# Patient Record
Sex: FEMALE | Race: WHITE | NOT HISPANIC OR LATINO
[De-identification: names, ages, dates, MRNs, and addresses within clinical notes are randomized per-mention and may not be internally consistent; named-entity substitution may affect disease eponyms.]

---

## 2017-06-23 ENCOUNTER — APPOINTMENT (OUTPATIENT)
Age: 57
End: 2017-06-23

## 2017-06-23 VITALS
SYSTOLIC BLOOD PRESSURE: 168 MMHG | HEART RATE: 60 BPM | WEIGHT: 164 LBS | HEIGHT: 60 IN | RESPIRATION RATE: 17 BRPM | TEMPERATURE: 98 F | BODY MASS INDEX: 32.2 KG/M2 | DIASTOLIC BLOOD PRESSURE: 94 MMHG

## 2017-06-23 LAB
ALBUMIN SERPL ELPH-MCNC: 4.7 G/DL
ALP BLD-CCNC: 64 U/L
ALT SERPL-CCNC: 11 U/L
ANION GAP SERPL CALC-SCNC: 15 MMOL/L
AST SERPL-CCNC: 22 U/L
BASOPHILS # BLD AUTO: 0.02 K/UL
BASOPHILS NFR BLD AUTO: 0.3 %
BILIRUB SERPL-MCNC: 0.6 MG/DL
BUN SERPL-MCNC: 19 MG/DL
CALCIUM SERPL-MCNC: 9.7 MG/DL
CHLORIDE SERPL-SCNC: 102 MMOL/L
CO2 SERPL-SCNC: 25 MMOL/L
CREAT SERPL-MCNC: 0.96 MG/DL
EOSINOPHIL # BLD AUTO: 0.16 K/UL
EOSINOPHIL NFR BLD AUTO: 2 %
GLUCOSE SERPL-MCNC: 89 MG/DL
HCT VFR BLD CALC: 40.4 %
HGB BLD-MCNC: 13.2 G/DL
IMM GRANULOCYTES NFR BLD AUTO: 0.1 %
LYMPHOCYTES # BLD AUTO: 3.18 K/UL
LYMPHOCYTES NFR BLD AUTO: 40.7 %
MAN DIFF?: NORMAL
MCHC RBC-ENTMCNC: 30.6 PG
MCHC RBC-ENTMCNC: 32.7 GM/DL
MCV RBC AUTO: 93.7 FL
MONOCYTES # BLD AUTO: 0.32 K/UL
MONOCYTES NFR BLD AUTO: 4.1 %
NEUTROPHILS # BLD AUTO: 4.13 K/UL
NEUTROPHILS NFR BLD AUTO: 52.8 %
PLATELET # BLD AUTO: 277 K/UL
POTASSIUM SERPL-SCNC: 4.2 MMOL/L
PROT SERPL-MCNC: 8.5 G/DL
RBC # BLD: 4.31 M/UL
RBC # FLD: 13.7 %
SODIUM SERPL-SCNC: 142 MMOL/L
WBC # FLD AUTO: 7.82 K/UL

## 2017-06-24 LAB — AFP-TM SERPL-MCNC: 5 NG/ML

## 2017-06-25 LAB
HCV RNA SERPL NAA DL=5-ACNC: NOT DETECTED IU/ML
HCV RNA SERPL NAA+PROBE-LOG IU: NOT DETECTED LOGIU/ML

## 2017-07-07 ENCOUNTER — APPOINTMENT (OUTPATIENT)
Dept: ULTRASOUND IMAGING | Facility: CLINIC | Age: 57
End: 2017-07-07

## 2017-07-07 ENCOUNTER — OUTPATIENT (OUTPATIENT)
Dept: OUTPATIENT SERVICES | Facility: HOSPITAL | Age: 57
LOS: 1 days | End: 2017-07-07
Payer: COMMERCIAL

## 2017-07-07 DIAGNOSIS — Z00.8 ENCOUNTER FOR OTHER GENERAL EXAMINATION: ICD-10-CM

## 2017-07-07 DIAGNOSIS — B19.20 UNSPECIFIED VIRAL HEPATITIS C WITHOUT HEPATIC COMA: ICD-10-CM

## 2017-07-07 PROCEDURE — 76700 US EXAM ABDOM COMPLETE: CPT

## 2017-12-01 ENCOUNTER — APPOINTMENT (OUTPATIENT)
Age: 57
End: 2017-12-01

## 2017-12-11 ENCOUNTER — APPOINTMENT (OUTPATIENT)
Age: 57
End: 2017-12-11
Payer: COMMERCIAL

## 2017-12-11 VITALS
HEART RATE: 55 BPM | DIASTOLIC BLOOD PRESSURE: 93 MMHG | WEIGHT: 160 LBS | HEIGHT: 60 IN | SYSTOLIC BLOOD PRESSURE: 152 MMHG | TEMPERATURE: 98.2 F | BODY MASS INDEX: 31.41 KG/M2 | RESPIRATION RATE: 17 BRPM

## 2017-12-11 DIAGNOSIS — B19.20 UNSPECIFIED VIRAL HEPATITIS C W/OUT HEPATIC COMA: ICD-10-CM

## 2017-12-11 LAB
AFP-TM SERPL-MCNC: 4.9 NG/ML
ALBUMIN SERPL ELPH-MCNC: 4.5 G/DL
ALP BLD-CCNC: 58 U/L
ALT SERPL-CCNC: 19 U/L
AST SERPL-CCNC: 27 U/L
BASOPHILS # BLD AUTO: 0.02 K/UL
BASOPHILS NFR BLD AUTO: 0.3 %
BILIRUB DIRECT SERPL-MCNC: 0.1 MG/DL
BILIRUB INDIRECT SERPL-MCNC: 0.5 MG/DL
BILIRUB SERPL-MCNC: 0.6 MG/DL
EOSINOPHIL # BLD AUTO: 0.22 K/UL
EOSINOPHIL NFR BLD AUTO: 3.3 %
HCT VFR BLD CALC: 38.9 %
HGB BLD-MCNC: 12.5 G/DL
IMM GRANULOCYTES NFR BLD AUTO: 0.2 %
LYMPHOCYTES # BLD AUTO: 2.81 K/UL
LYMPHOCYTES NFR BLD AUTO: 42.7 %
MAN DIFF?: NORMAL
MCHC RBC-ENTMCNC: 30.2 PG
MCHC RBC-ENTMCNC: 32.1 GM/DL
MCV RBC AUTO: 94 FL
MONOCYTES # BLD AUTO: 0.4 K/UL
MONOCYTES NFR BLD AUTO: 6.1 %
NEUTROPHILS # BLD AUTO: 3.12 K/UL
NEUTROPHILS NFR BLD AUTO: 47.4 %
PLATELET # BLD AUTO: 281 K/UL
PROT SERPL-MCNC: 8 G/DL
RBC # BLD: 4.14 M/UL
RBC # FLD: 13.4 %
WBC # FLD AUTO: 6.58 K/UL

## 2017-12-11 PROCEDURE — 99213 OFFICE O/P EST LOW 20 MIN: CPT

## 2017-12-13 LAB
HCV RNA SERPL NAA DL=5-ACNC: NOT DETECTED
HCV RNA SERPL NAA+PROBE-LOG IU: NOT DETECTED LOGIU/ML

## 2018-12-13 ENCOUNTER — APPOINTMENT (OUTPATIENT)
Dept: HEPATOLOGY | Facility: CLINIC | Age: 58
End: 2018-12-13
Payer: COMMERCIAL

## 2018-12-13 VITALS
SYSTOLIC BLOOD PRESSURE: 136 MMHG | HEART RATE: 53 BPM | DIASTOLIC BLOOD PRESSURE: 76 MMHG | RESPIRATION RATE: 17 BRPM | TEMPERATURE: 98.2 F | WEIGHT: 173 LBS | HEIGHT: 60 IN | BODY MASS INDEX: 33.96 KG/M2

## 2018-12-13 DIAGNOSIS — B18.2 CHRONIC VIRAL HEPATITIS C: ICD-10-CM

## 2018-12-13 LAB
BASOPHILS # BLD AUTO: 0.02 K/UL
BASOPHILS NFR BLD AUTO: 0.3 %
EOSINOPHIL # BLD AUTO: 0.18 K/UL
EOSINOPHIL NFR BLD AUTO: 3.1 %
HCT VFR BLD CALC: 37.4 %
HGB BLD-MCNC: 12.2 G/DL
IMM GRANULOCYTES NFR BLD AUTO: 0.2 %
LYMPHOCYTES # BLD AUTO: 2.37 K/UL
LYMPHOCYTES NFR BLD AUTO: 40.6 %
MAN DIFF?: NORMAL
MCHC RBC-ENTMCNC: 29.8 PG
MCHC RBC-ENTMCNC: 32.6 GM/DL
MCV RBC AUTO: 91.2 FL
MONOCYTES # BLD AUTO: 0.45 K/UL
MONOCYTES NFR BLD AUTO: 7.7 %
NEUTROPHILS # BLD AUTO: 2.81 K/UL
NEUTROPHILS NFR BLD AUTO: 48.1 %
PLATELET # BLD AUTO: 275 K/UL
RBC # BLD: 4.1 M/UL
RBC # FLD: 13.1 %
WBC # FLD AUTO: 5.84 K/UL

## 2018-12-13 PROCEDURE — 99213 OFFICE O/P EST LOW 20 MIN: CPT | Mod: 25

## 2018-12-13 PROCEDURE — 91200 LIVER ELASTOGRAPHY: CPT

## 2018-12-13 PROCEDURE — ZZZZZ: CPT

## 2018-12-14 LAB
AFP-TM SERPL-MCNC: 4.2 NG/ML
ALBUMIN SERPL ELPH-MCNC: 4.9 G/DL
ALP BLD-CCNC: 53 U/L
ALT SERPL-CCNC: 18 U/L
ANION GAP SERPL CALC-SCNC: 12 MMOL/L
AST SERPL-CCNC: 21 U/L
BILIRUB SERPL-MCNC: 0.5 MG/DL
BUN SERPL-MCNC: 18 MG/DL
CALCIUM SERPL-MCNC: 9.7 MG/DL
CHLORIDE SERPL-SCNC: 102 MMOL/L
CO2 SERPL-SCNC: 28 MMOL/L
CREAT SERPL-MCNC: 0.76 MG/DL
GLUCOSE SERPL-MCNC: 89 MG/DL
HCV RNA SERPL NAA DL=5-ACNC: NOT DETECTED IU/ML
HCV RNA SERPL NAA+PROBE-LOG IU: NOT DETECTED LOGIU/ML
POTASSIUM SERPL-SCNC: 4.2 MMOL/L
PROT SERPL-MCNC: 7.5 G/DL
SODIUM SERPL-SCNC: 142 MMOL/L

## 2020-01-10 ENCOUNTER — APPOINTMENT (OUTPATIENT)
Dept: HEPATOLOGY | Facility: CLINIC | Age: 60
End: 2020-01-10

## 2024-04-15 ENCOUNTER — OUTPATIENT (OUTPATIENT)
Dept: OUTPATIENT SERVICES | Facility: HOSPITAL | Age: 64
LOS: 1 days | Discharge: STILL ADMITTED OR ADMINISTRATIVE | End: 2024-04-15
Payer: MEDICARE

## 2024-04-15 ENCOUNTER — EMERGENCY (EMERGENCY)
Facility: HOSPITAL | Age: 64
LOS: 1 days | Discharge: HOME | End: 2024-04-15
Admitting: EMERGENCY MEDICINE
Payer: MEDICARE

## 2024-04-15 VITALS
HEART RATE: 66 BPM | RESPIRATION RATE: 20 BRPM | TEMPERATURE: 98 F | DIASTOLIC BLOOD PRESSURE: 81 MMHG | SYSTOLIC BLOOD PRESSURE: 133 MMHG | OXYGEN SATURATION: 99 %

## 2024-04-15 DIAGNOSIS — F41.9 ANXIETY DISORDER, UNSPECIFIED: ICD-10-CM

## 2024-04-15 DIAGNOSIS — F32.9 MAJOR DEPRESSIVE DISORDER, SINGLE EPISODE, UNSPECIFIED: ICD-10-CM

## 2024-04-15 PROCEDURE — 90836 PSYTX W PT W E/M 45 MIN: CPT | Mod: 95

## 2024-04-15 PROCEDURE — 99284 EMERGENCY DEPT VISIT MOD MDM: CPT

## 2024-04-15 PROCEDURE — 99215 OFFICE O/P EST HI 40 MIN: CPT | Mod: 95

## 2024-04-15 PROCEDURE — 90792 PSYCH DIAG EVAL W/MED SRVCS: CPT

## 2024-04-15 RX ORDER — FAMOTIDINE 10 MG/ML
20 INJECTION INTRAVENOUS ONCE
Refills: 0 | Status: COMPLETED | OUTPATIENT
Start: 2024-04-15 | End: 2024-04-15

## 2024-04-15 RX ADMIN — FAMOTIDINE 20 MILLIGRAM(S): 10 INJECTION INTRAVENOUS at 15:32

## 2024-04-15 RX ADMIN — Medication 30 MILLILITER(S): at 15:32

## 2024-04-15 RX ADMIN — Medication 0.5 MILLIGRAM(S): at 15:32

## 2024-04-15 NOTE — ED BEHAVIORAL HEALTH ASSESSMENT NOTE - REFERRAL / APPOINTMENT DETAILS
encouraged to go back to [facility] PRN - as a bridge to eventually transition her psych care to a dedicated geropsych clinic;  other OP psych clinics within Pt's community

## 2024-04-15 NOTE — ED BEHAVIORAL HEALTH ASSESSMENT NOTE - HPI (INCLUDE ILLNESS QUALITY, SEVERITY, DURATION, TIMING, CONTEXT, MODIFYING FACTORS, ASSOCIATED SIGNS AND SYMPTOMS)
63 yr old female, , domiciled alone and a retiree. self reports hx of depression + anxiety; no psych admissions; past [facility] OPD attendance, 5/24/2000 - 1/24/2001; currently, not in any psych care - gets lexapro and xanax PRN prescribed by her PMD.  denied any hx of illicit substance use including alcohol.  Pt had recent [facility] ED visit anxiety.  pertinent medical issues: HTN, HLD, hx of gastritis; s/p EDG and s/p colonoscopy recently.  today, sent in from our [facility] due to depression (for a voluntary admission as no beds available).    denied experiencing any signs/ symptoms suggestive of ashli (denied grandiosity/ racing thoughts/ increased goal directed activities or engaged in risk taking behavior/ no pressured speech/ no elevated mood/ denied any increased in energy level causing sleep disruption).  is not feeling paranoid. denied any perceptual disturbances. no thought insertion/ withdrawal/ broadcasting 63 yr old female, , domiciled alone and a retiree. self reports hx of depression + anxiety; no psych admissions; past [facility] OPD attendance, 5/24/2000 - 1/24/2001; currently, not in any psych care - gets lexapro and xanax PRN prescribed by her PMD.  denied any hx of illicit substance use including alcohol.  Pt had recent [facility] ED visit anxiety.  pertinent medical issues: HTN, HLD, hx of gastritis; s/p EDG and s/p colonoscopy recently.  today, sent in from our [facility] due to depression (for a voluntary admission as no beds available).    Pt denied experiencing any signs/ symptoms suggestive of ashli (denied grandiosity/ racing thoughts/ increased goal directed activities or engaged in risk taking behavior/ no pressured speech/ no elevated mood/ denied any increased in energy level causing sleep disruption).  is not feeling paranoid. denied any perceptual disturbances. no thought insertion/ withdrawal/ broadcasting    COLLATERAL INFORMATION OBTAINED FROM FRIENDS:   who reported that Pt has hx of depression and anxiety. today, they decided to check up on the Pt as she was not answering her phone. when they came to her house, she was crying. told them that she felt overwhelmed and "was not feeling good".  she requested to be brought to the hospital.  depressive symptoms described as "not wanting to do anything"; has decreased oral intake; with low energy level. no SI and no HI reported. no psychotic symptoms. not aggressive or violent.  baseline: described as a good, kind and helpful individual.  friends decided to accompany her to the hospital. they expressed support that if the Pt gets discharged (from the  ED), they will come to her house, check on her.  friends reported that overall, the Pt is close with her 2 sons and their families (who are based in [place]); a son who is based in [place] is coming this month to visit the Pt 63 yr old female, , domiciled alone and a retiree. self reports hx of depression + anxiety; no psych admissions; past [facility] OPD attendance, 5/24/2000 - 1/24/2001; currently, not in any psych care - gets lexapro and xanax PRN prescribed by her PMD.  denied any hx of illicit substance use including alcohol.  Pt had recent [facility] ED visit anxiety.  pertinent medical issues: HTN, HLD, hx of gastritis; s/p EDG and s/p colonoscopy recently.  today, sent in from our [facility] due to depression (for a voluntary admission as no beds available).    Pt claims she has been experiencing "on and off bouts of depression and anxiety" for many yrs now.  overall though, she was able to cope/ deal with said symptom until about a month ago wherein she started to progressively feel depressed and anxious. recently, anxiety symptoms predominating depressive symptoms to a point that current xanax dose of 0.25mg daily PRN has self titrated to 0.5mg daily.  she also admits to self titrating her lexapro dose from 5mg daily to 20mg daily in an effort to stave off worsening depression. as it seems that said titration was not helping control symptoms, she decided to revert back to the current lexapro dose of 5mg daily.      described depressive symptoms as experiencing sad mood + anhedonia; associated symptoms include low energy level, poor concentration. no reported changes to her sleeping pattern. did admit that she has lost appetite - with resultant wt loss.  in addition, also claimed feeling hopeless. denied harboring any passive or active SI/ HI.   able to cite the following stressors (which had precipitated and perpetuated ongoing episode of depression + anxiety) : conflictual relationships; "stressed out" after she bought a condo in [place] - now, having "problems related to this condo"; and a former tenant of her's passed away x 1 month ago.      apart from her depression, also reports feeling anxious - experiencing panic attacks; waking up in the morning and having "gastric pains/ abdominal discomfort/ butterflies in her stomach sensation".  reported that she had recently undergone endoscopy. was told it was "normal" but prescribed with omeprazole PO x 14 days.  more so, admitted to excessively worrying about everything.  "I just want to feel good", she told writer.  Pt noted to be anxious.  writer attempted several times to persuade Pt to pursue 9.13 admission - and that she will temporarily board here at our  ED. she is adamant that she is unable to tolerate being boarded here overnight. she wants to go back home.     Pt denied experiencing any signs/ symptoms suggestive of ashli (denied grandiosity/ racing thoughts/ increased goal directed activities or engaged in risk taking behavior/ no pressured speech/ no elevated mood/ denied any increased in energy level causing sleep disruption).  is not feeling paranoid. denied any perceptual disturbances. no thought insertion/ withdrawal/ broadcasting    COLLATERAL INFORMATION OBTAINED FROM FRIENDS:   who reported that Pt has hx of depression and anxiety. today, they decided to check up on the Pt as she was not answering her phone. when they came to her house, she was crying. told them that she felt overwhelmed and "was not feeling good".  she requested to be brought to the hospital.  depressive symptoms described as "not wanting to do anything"; has decreased oral intake; with low energy level. no SI and no HI reported. no psychotic symptoms. not aggressive or violent.  baseline: described as a good, kind and helpful individual.  friends decided to accompany her to the hospital. they expressed support that if the Pt gets discharged (from the  ED), they will come to her house, check on her.  friends reported that overall, the Pt is close with her 2 sons and their families (who are based in [place]); a son who is based in [place] is coming this month to visit the Pt

## 2024-04-15 NOTE — ED BEHAVIORAL HEALTH ASSESSMENT NOTE - NSACTIVEVENT_PSY_ALL_CORE
conflictual relationships; "stressed out" after she bought a condo in [place] - now, having "problems related to this condo"; a former tenant of her's passed away x 1 month ago/Triggering events leading to humiliation, shame, and/or despair (e.g., Loss of relationship, financial or health status) (real or anticipated)

## 2024-04-15 NOTE — ED ADULT NURSE NOTE - OBJECTIVE STATEMENT
pt. received to  from walk in triage brought in by self from the [facility] center presenting for a psych eval. pt. endorses an increased in inability to perform ADLs, increased in depression and anxiety over the past x2 months. pt. maintains eye contact upon interview. pt. calm, cooperative and rediretable. Denies S/I and H/I,  A/H and V/H, ETOH/Drug use. Pt. belongings secured. pt. wanded for safety. pt. independently changed into  Clothing. eval on going at this time.

## 2024-04-15 NOTE — ED BEHAVIORAL HEALTH ASSESSMENT NOTE - OTHER
CVM, I stop conflictual relationships; "stressed out" after she bought a condo in [place] - now, having "problems related to this condo"; a former tenant of her's passed away x 1 month ago daughter in law, close friends-neighbors distracted Dr HERBERT White, [facility] attending

## 2024-04-15 NOTE — ED BEHAVIORAL HEALTH ASSESSMENT NOTE - SUMMARY
63/F with self reported hx of depression + anxiety; no psych admissions; past [facility] OPD attendance, 5/24/2000 - 1/24/2001; currently, not in any psych care - gets lexapro and xanax PRN prescribed by her PMD.  denied any hx of illicit substance use including alcohol.   today, sent in from our [facility] due to depression (for a voluntary admission as no beds available).    at this time, endorses progressively worsening symptoms of depression and anxiety.  depressive symptoms meeting MDD criteria whereas differentials to entertain for her current anxiety symptoms include: ZENON (admits to excessively worrying about everything) with panic attacks; panic disorder.      whilst she does admit feeling hopeless and overwhelmed, THERE HAS NEVER BEEN ANY PASSIVE OR ACTIVE SUICIDAL/ HOMICIDAL IDEATIONS, INTENT AND PLANS.  Pt initially expressed interest pursuing a 9.13 admission (at the [facility] today).  when she was adviced that currently, there are no female [facility] psych beds and that, she will have to board here at the  ED, she rescinded her 9.13 request.      at this time, the Pt does not present with symptoms at a point wherein  ED service is able to pursue involuntary admission - Pt DOES NOT MEET 9.39/9.27 criteria.  currently, not acutely manic; is not psychotic.  Pt is not delirious. is not intoxicated or in withdrawal.  able to partake towards discussion of her psych care - expressed interest towards availing of out-Pt psych services. willing to comeback to a nearby ED/ hosp if symptoms worsen.       RECOMMENDATIONS:   1. Psychoeducation provided.  Encouraged follow up with OP psych services.  No indication for emergent psych meds changes at this time. Discussed important role of psychotherapy.  for now, encouraged Pt to be compliant with current lexapro 5mg daily - which will need to be titrated again or may consider switching to another anti depressant.   2. Emergency protocol reviewed.  Pt, friends, daughter in law were adviced to call 911 or come to the nearest ED should symptoms worsen; have increasing bouts of agitation/aggressive behavior; having SI/HI; or call [phone]    3. given resources to the community: other OP psych clinics within Pt's community; encouraged [facility] PRN  4. ativan 0.5mg daily PRN x 3 day supply (for anxiety) 63/F with self reported hx of depression + anxiety; no psych admissions; past [facility] OPD attendance, 5/24/2000 - 1/24/2001; currently, not in any psych care - gets lexapro and xanax PRN prescribed by her PMD.  denied any hx of illicit substance use including alcohol.   today, sent in from our [facility] due to depression (for a voluntary admission as no beds available).    at this time, endorses progressively worsening symptoms of depression and anxiety.  depressive symptoms meeting MDD criteria whereas differentials to entertain for her current anxiety symptoms include: ZENON (admits to excessively worrying about everything) with panic attacks; panic disorder.      whilst she does admit feeling hopeless and overwhelmed, THERE HAS NEVER BEEN ANY PASSIVE OR ACTIVE SUICIDAL/ HOMICIDAL IDEATIONS, INTENT AND PLANS.  Pt initially expressed interest pursuing a 9.13 admission (at the [facility] today).  when she was adviced that currently, there are no female [facility] psych beds and that, she will have to board here at the  ED, she rescinded her 9.13 request.      at this time, the Pt does not present with symptoms at a point wherein  ED service is able to pursue involuntary admission - Pt DOES NOT MEET 9.39/9.27 criteria.  currently, not acutely manic; is not psychotic.  Pt is not delirious. is not intoxicated or in withdrawal.  able to partake towards discussion of her psych care - expressed interest towards availing of out-Pt psych services. willing to comeback to a nearby ED/ hosp if symptoms worsen.       RECOMMENDATIONS:   1. Psychoeducation provided.  Encouraged follow up with OP psych services.  No indication for emergent psych meds changes at this time. Discussed important role of psychotherapy.  for now, encouraged Pt to be compliant with current lexapro 5mg daily - which will need to be titrated again or may consider switching to another anti depressant.   2. Emergency protocol reviewed.  Pt, friends, daughter in law were adviced to call 911 or come to the nearest ED should symptoms worsen; have increasing bouts of agitation/aggressive behavior; having SI/HI; or call [phone]    3. given resources to the community: other OP psych clinics within Pt's community; encouraged [facility] PRN  4. ativan 0.5mg daily PRN x 3 day supply (for anxiety)    - ADDENDUM:  Pt has upcoming appt at [facility] this monday.. today, that appt was cancelled per daughter in law

## 2024-04-15 NOTE — ED BEHAVIORAL HEALTH ASSESSMENT NOTE - OTHER PAST PSYCHIATRIC HISTORY (INCLUDE DETAILS REGARDING ONSET, COURSE OF ILLNESS, INPATIENT/OUTPATIENT TREATMENT)
self endorses hx of depression + anxiety  claimed past hx of post partum depression   past [facility] OPD attendance, 5/24/2000 - 1/24/2001  no reported hx of in Pt psych admissions  denied any hx of SA nor engaged in any NSSIB  currently, NOT in psych care  today, self presented to the [facility]  due to symptoms of depression and anxiety as referred by her PMD; described symptoms as worsening depression + severe anxiety x > 3 months now.    - attending to ADLs but requiring more effort; spending most of the day on the couch  - claimed loosing 20 lbs over past 2 months due to poor appetite  - has been feeling very overwhelmed and hopeless

## 2024-04-15 NOTE — ED PROVIDER NOTE - PATIENT PORTAL LINK FT
You can access the FollowMyHealth Patient Portal offered by [facility] by registering at the following website: [email or web address]. By joining [facility]’s FollowMyHealth portal, you will also be able to view your health information using other applications (apps) compatible with our system.

## 2024-04-15 NOTE — ED BEHAVIORAL HEALTH ASSESSMENT NOTE - RISK ASSESSMENT
Risk Assessment:  Modifiable risk factors: depression, anxiety  Unmodifiable risk factors: elderly female, hx of depression/ anxiety, not in psych care for a long while now   Protective factors: currently NO ACTIVE OBJECTIVE findings of acute suicidality, no reported hx of SA nor any NSSIB, no family hx of SA, Pt's sense of responsibility to family, positive therapeutic relationship with family, social supports, no reported access to guns, no chronic pains, Restorationism, currently not acutely manic, not psychotic, not intoxicated or in withdrawal, no pending legal issues    Given above, the Pt is currently NOT in acute imminent risk of self harm as well as also NOT being at chronically elevated risk of self-harm.  currently, there is no identifiable acute increase in risk that   would be mitigated by an involuntary psychiatric admission. Pt refused to pursue voluntary admission to in-Pt unit (does not want to board overnight here at the  ED - claims she would feel more anxious if she stays here)

## 2024-04-15 NOTE — ED BEHAVIORAL HEALTH ASSESSMENT NOTE - MEDICATIONS (PRESCRIPTIONS, DIRECTIONS)
ativan 0.5mg daily PRN x 3 day supply (for anxiety). encouraged continued compliance with lexapro 5mg daily (will need titration)

## 2024-04-15 NOTE — ED ADULT NURSE NOTE - NSFALLUNIVINTERV_ED_ALL_ED
Bed/Stretcher in lowest position, wheels locked, appropriate side rails in place/Call bell, personal items and telephone in reach/Instruct patient to call for assistance before getting out of bed/chair/stretcher/Non-slip footwear applied when patient is off stretcher/[place] to call system/Physically safe environment - no spills, clutter or unnecessary equipment/Purposeful proactive rounding/Room/bathroom lighting operational, light cord in reach

## 2024-04-15 NOTE — ED BEHAVIORAL HEALTH ASSESSMENT NOTE - DIFFERENTIAL
MDD  anxiety: consider ZENON (admits to excessively worrying about everything) with panic attacks; panic disorder

## 2024-04-15 NOTE — ED BEHAVIORAL HEALTH ASSESSMENT NOTE - DETAILS
daughter in law, Carole Schrader updated re: final dispo.  case discussed with RUMA Stone none PATIENT IS NOT SUICIDAL

## 2024-04-15 NOTE — ED BEHAVIORAL HEALTH ASSESSMENT NOTE - NSSUICPROTFACT_PSY_ALL_CORE
Responsibility to children, family, or others/Identifies reasons for living/Supportive social network of family or friends/Fear of death or the actual act of killing self/Cultural, spiritual and/or moral attitudes against suicide/Positive therapeutic relationships/Pentecostalism beliefs

## 2024-04-15 NOTE — ED BEHAVIORAL HEALTH NOTE - BEHAVIORAL HEALTH NOTE
[facility]  Reference #: [id number]    Practitioner Count: 2  Pharmacy Count: 1  Current Opioid Prescriptions: 0  Current Benzodiazepine Prescriptions: 0  Current Stimulant Prescriptions: 0      Patient Demographic Information (PDI)       PDI	First Name	Last Name	Birth Date	Gender	Street Address	City	State	Zip Code  ALONDRA Schrader	1960	[place]	[address] [place]	[address]    Prescription Information      PDI Filter:    PDI	Current Rx	Drug Type	Rx Written	Rx Dispensed	Drug	Quantity	Days Supply	Prescriber Name	Prescriber CORINA #	Payment Method	Dispenser  A	N	B	03/25/2024	03/25/2024	alprazolam 0.5 mg tablet	30	15	Diana Rg	[id number]	Insurance	[facility]  A	N	B	02/15/2024	02/16/2024	alprazolam 0.25 mg tablet	30	30	Rayo Spann	[id number]	Insurance	[facility]      no yield on the psyckes    per CVM, OPD attendance, 5/24/2000 - 1/24/2001

## 2024-04-15 NOTE — ED BEHAVIORAL HEALTH ASSESSMENT NOTE - PAST PSYCHOTROPIC MEDICATION
self titrated lexapro from 5mg to 20mg daily previously - as a means to help control her depression/ anxiety symptoms; did not seek any psych consultation re: this titration. then titrated it back again to current dose of 5mg daily    reports her current xanax use from 0.25mg to 0.5mg daily PRN to current 0.5mg daily

## 2024-04-15 NOTE — ED BEHAVIORAL HEALTH ASSESSMENT NOTE - DESCRIPTION
HTN, HLD, gastritis; s/p EGD, s/p colonoscopy. meds: omeprazole 40mg AM, crestor 5mg HS, norvasc 5mg HS and losartan 50mg HS Since her  ED arrival, the Pt has been anxious and cooperative.  There has been no agitation/aggressive behavior.  No verbalization of active/ passive SI/HI.   There are no signs/symptoms of acute ashli or florid psychosis.  Pt is not showing any signs/symptoms of intoxication or withdrawal.  she is not delirious.. Pt has not tested limits.. Has maintained appropriate boundaries. Pt has been easily redirected.  Overall, there has been no management issues.    Vital Signs Last 24 Hrs  T(C): 36.6 (15 Apr 2024 13:41), Max: 36.6 (15 Apr 2024 13:41)  T(F): 97.9 (15 Apr 2024 13:41), Max: 97.9 (15 Apr 2024 13:41)  HR: 66 (15 Apr 2024 13:41) (66 - 66)  BP: 133/81 (15 Apr 2024 13:41) (133/81 - 133/81)  BP(mean): --  RR: 20 (15 Apr 2024 13:41) (20 - 20)  SpO2: 99% (15 Apr 2024 13:41) (99% - 99%)    Parameters below as of 15 Apr 2024 13:41  Patient On (Oxygen Delivery Method): room air  for 36 yrs until her   on 2013. 3 sons (2 sons living in [place]; another son based in [place]); has 4 grandchildren (3 girls, 1 boys).  retired school staff (since 5 yrs ago). originally from [place]. likes cooking, cleaning her house, being with friends, shopping, and gardening.  is Nondenominational. no reported access to guns

## 2024-04-15 NOTE — ED BEHAVIORAL HEALTH ASSESSMENT NOTE - NSPRESENTSXS_PSY_ALL_CORE
NO SUICIDAL/ NO HOMICIDAL IDEATIONS, INTENTIONS AND PLANS/Depressed mood/Anhedonia/Hopelessness or despair/Severe anxiety, agitation or panic

## 2024-04-15 NOTE — ED BEHAVIORAL HEALTH NOTE - BEHAVIORAL HEALTH NOTE
As per request of provider, writer contacted pt’s Daughter in law shikha diggs [phone] to obtain collateral information. The following information is per daughter  Pt is a 62 yo female domiciled w/ alone, [facility] Pt came in for worsening depression and anxiety. She says pt also had comlpaints of stomach pain. Pt has not endorsed any si or hi and has no hx of this. She says pt does appear hopeless. She says pt’s hygiene and appetite has worsened . she says the stressor is family/friend deaths. At baseline she is has anxiety and depression but it has worsened since February. She says medical problems include inflammation of stomach. Writer informed her pt was cleared for discharge and does not want to be admitted at this time. She says pt had an apt today w/ [facility] but it was cancelled. She does not know if she has a f/.u appointment.     Spoke w/ pt’s son in waiting room and explained pt would be cleared for discharge and offered f/u. writer explained pt was not at a point to be hospitalized involuntarily.    Writer met with pt at bedside to discuss urgent referral.  Pt reports being linked to [facility] and has an appointment rescheduled for Monday 04/22. [facility] crisis center information was provided to bridge pt if needed

## 2024-04-15 NOTE — ED ADULT NURSE NOTE - CHIEF COMPLAINT QUOTE
pt sent for ADM by Dr. Mere White pt stated for 2 months feeling depress, anxious, lost of appetite, + nausea, denies SI.  pt states in the morning is when she feels the worse and take Xanax.

## 2024-04-15 NOTE — ED BEHAVIORAL HEALTH ASSESSMENT NOTE - NSBHROSSYSTEMS_PSY_ALL_CORE
currently Pt denied any headaches, no dizziness, no blurring of vision; no sorethroat; no cough, no fever. no chest pains, no SOB, no palpitations, no abdominal pains, no nausea/ vomiting/ diarrhea, no dysuria, no hesitancy, no arthralgia/ no pruritus. denied any muscle/ joint pains

## 2024-04-16 DIAGNOSIS — F33.9 MAJOR DEPRESSIVE DISORDER, RECURRENT, UNSPECIFIED: ICD-10-CM

## 2024-04-16 NOTE — ED BEHAVIORAL HEALTH NOTE - BEHAVIORAL HEALTH NOTE
High Risk log:  Number listed in sunrise [phone]. writer called and left voicemail requesting for call back.

## 2024-04-17 NOTE — ED BEHAVIORAL HEALTH NOTE - BEHAVIORAL HEALTH NOTE
High Risk log:  Number listed in sunrise [phone]. writer called patient ([phone]) who states that she is doing ok. She states that she feels weak but is following up  with her pcp Dr. Rayo Spann at 11am today. She states that she has an apt for mental health on Monday. No other concerns noted.

## 2025-02-03 ENCOUNTER — TRANSCRIPTION ENCOUNTER (OUTPATIENT)
Age: 65
End: 2025-02-03

## 2025-03-01 ENCOUNTER — EMERGENCY (EMERGENCY)
Facility: HOSPITAL | Age: 65
LOS: 1 days | Discharge: HOME | End: 2025-03-01
Attending: STUDENT IN AN ORGANIZED HEALTH CARE EDUCATION/TRAINING PROGRAM | Admitting: STUDENT IN AN ORGANIZED HEALTH CARE EDUCATION/TRAINING PROGRAM
Payer: MEDICARE

## 2025-03-01 VITALS
HEIGHT: 60 IN | DIASTOLIC BLOOD PRESSURE: 83 MMHG | SYSTOLIC BLOOD PRESSURE: 124 MMHG | TEMPERATURE: 98 F | WEIGHT: 162.92 LBS | OXYGEN SATURATION: 98 % | RESPIRATION RATE: 20 BRPM | HEART RATE: 60 BPM

## 2025-03-01 DIAGNOSIS — F41.9 ANXIETY DISORDER, UNSPECIFIED: ICD-10-CM

## 2025-03-01 DIAGNOSIS — F33.2 MAJOR DEPRESSIVE DISORDER, RECURRENT SEVERE WITHOUT PSYCHOTIC FEATURES: ICD-10-CM

## 2025-03-01 PROBLEM — F32.9 MAJOR DEPRESSIVE DISORDER, SINGLE EPISODE, UNSPECIFIED: Chronic | Status: ACTIVE | Noted: 2024-04-15

## 2025-03-01 PROBLEM — K29.70 GASTRITIS, UNSPECIFIED, WITHOUT BLEEDING: Chronic | Status: ACTIVE | Noted: 2024-04-15

## 2025-03-01 LAB
ADD ON TEST-SPECIMEN IN LAB: SIGNIFICANT CHANGE UP
ALBUMIN SERPL ELPH-MCNC: 4.6 G/DL — SIGNIFICANT CHANGE UP (ref 3.3–5)
ALP SERPL-CCNC: 80 U/L — SIGNIFICANT CHANGE UP (ref 40–120)
ALT FLD-CCNC: 19 U/L — SIGNIFICANT CHANGE UP (ref 10–45)
ANION GAP SERPL CALC-SCNC: 13 MMOL/L — SIGNIFICANT CHANGE UP (ref 5–17)
APAP SERPL-MCNC: <15 UG/ML — SIGNIFICANT CHANGE UP (ref 10–30)
AST SERPL-CCNC: 18 U/L — SIGNIFICANT CHANGE UP (ref 10–40)
BASOPHILS # BLD AUTO: 0.05 K/UL — SIGNIFICANT CHANGE UP (ref 0–0.2)
BASOPHILS NFR BLD AUTO: 0.6 % — SIGNIFICANT CHANGE UP (ref 0–2)
BILIRUB SERPL-MCNC: 1 MG/DL — SIGNIFICANT CHANGE UP (ref 0.2–1.2)
BUN SERPL-MCNC: 15 MG/DL — SIGNIFICANT CHANGE UP (ref 7–23)
CALCIUM SERPL-MCNC: 10.1 MG/DL — SIGNIFICANT CHANGE UP (ref 8.4–10.5)
CHLORIDE SERPL-SCNC: 100 MMOL/L — SIGNIFICANT CHANGE UP (ref 96–108)
CO2 SERPL-SCNC: 26 MMOL/L — SIGNIFICANT CHANGE UP (ref 22–31)
CREAT SERPL-MCNC: 0.78 MG/DL — SIGNIFICANT CHANGE UP (ref 0.5–1.3)
EGFR: 84 ML/MIN/1.73M2 — SIGNIFICANT CHANGE UP
EOSINOPHIL # BLD AUTO: 0 K/UL — SIGNIFICANT CHANGE UP (ref 0–0.5)
EOSINOPHIL NFR BLD AUTO: 0 % — SIGNIFICANT CHANGE UP (ref 0–6)
ETHANOL SERPL-MCNC: <10 MG/DL — SIGNIFICANT CHANGE UP (ref 0–10)
FLUAV AG NPH QL: SIGNIFICANT CHANGE UP
FLUBV AG NPH QL: SIGNIFICANT CHANGE UP
GLUCOSE SERPL-MCNC: 112 MG/DL — HIGH (ref 70–99)
HCT VFR BLD CALC: 40.2 % — SIGNIFICANT CHANGE UP (ref 34.5–45)
HGB BLD-MCNC: 13 G/DL — SIGNIFICANT CHANGE UP (ref 11.5–15.5)
IMM GRANULOCYTES NFR BLD AUTO: 0.5 % — SIGNIFICANT CHANGE UP (ref 0–0.9)
LYMPHOCYTES # BLD AUTO: 2.62 K/UL — SIGNIFICANT CHANGE UP (ref 1–3.3)
LYMPHOCYTES # BLD AUTO: 29.7 % — SIGNIFICANT CHANGE UP (ref 13–44)
MCHC RBC-ENTMCNC: 29.4 PG — SIGNIFICANT CHANGE UP (ref 27–34)
MCHC RBC-ENTMCNC: 32.3 G/DL — SIGNIFICANT CHANGE UP (ref 32–36)
MCV RBC AUTO: 91 FL — SIGNIFICANT CHANGE UP (ref 80–100)
MONOCYTES # BLD AUTO: 0.85 K/UL — SIGNIFICANT CHANGE UP (ref 0–0.9)
MONOCYTES NFR BLD AUTO: 9.6 % — SIGNIFICANT CHANGE UP (ref 2–14)
NEUTROPHILS # BLD AUTO: 5.25 K/UL — SIGNIFICANT CHANGE UP (ref 1.8–7.4)
NEUTROPHILS NFR BLD AUTO: 59.6 % — SIGNIFICANT CHANGE UP (ref 43–77)
NRBC BLD AUTO-RTO: 0 /100 WBCS — SIGNIFICANT CHANGE UP (ref 0–0)
PLATELET # BLD AUTO: 388 K/UL — SIGNIFICANT CHANGE UP (ref 150–400)
POTASSIUM SERPL-MCNC: 4.7 MMOL/L — SIGNIFICANT CHANGE UP (ref 3.5–5.3)
POTASSIUM SERPL-SCNC: 4.7 MMOL/L — SIGNIFICANT CHANGE UP (ref 3.5–5.3)
PROT SERPL-MCNC: 7.7 G/DL — SIGNIFICANT CHANGE UP (ref 6–8.3)
RBC # BLD: 4.42 M/UL — SIGNIFICANT CHANGE UP (ref 3.8–5.2)
RBC # FLD: 12.8 % — SIGNIFICANT CHANGE UP (ref 10.3–14.5)
RSV RNA NPH QL NAA+NON-PROBE: SIGNIFICANT CHANGE UP
SALICYLATES SERPL-MCNC: <2 MG/DL — LOW (ref 15–30)
SARS-COV-2 RNA SPEC QL NAA+PROBE: SIGNIFICANT CHANGE UP
SODIUM SERPL-SCNC: 139 MMOL/L — SIGNIFICANT CHANGE UP (ref 135–145)
TSH SERPL-MCNC: 0.53 UIU/ML — SIGNIFICANT CHANGE UP (ref 0.27–4.2)
WBC # BLD: 8.81 K/UL — SIGNIFICANT CHANGE UP (ref 3.8–10.5)
WBC # FLD AUTO: 8.81 K/UL — SIGNIFICANT CHANGE UP (ref 3.8–10.5)

## 2025-03-01 PROCEDURE — 90792 PSYCH DIAG EVAL W/MED SRVCS: CPT

## 2025-03-01 PROCEDURE — 99285 EMERGENCY DEPT VISIT HI MDM: CPT

## 2025-03-01 RX ORDER — LORAZEPAM 4 MG/ML
1 VIAL (ML) INJECTION EVERY 8 HOURS
Refills: 0 | Status: DISCONTINUED | OUTPATIENT
Start: 2025-03-01 | End: 2025-03-02

## 2025-03-01 RX ORDER — LORAZEPAM 4 MG/ML
1 VIAL (ML) INJECTION AT BEDTIME
Refills: 0 | Status: COMPLETED | OUTPATIENT
Start: 2025-03-01 | End: 2025-03-08

## 2025-03-01 RX ORDER — ESCITALOPRAM OXALATE 20 MG/1
10 TABLET ORAL DAILY
Refills: 0 | Status: DISCONTINUED | OUTPATIENT
Start: 2025-03-01 | End: 2025-03-04

## 2025-03-01 NOTE — ED ADULT TRIAGE NOTE - CHIEF COMPLAINT QUOTE
Anxiety and depression. Went to [facility] a couple of weeks ago and was admitted however signed out.   No hallucinations, Denies SI/HI and no hx of SI.   Takes 10mg Lexapro and xanax prn.

## 2025-03-01 NOTE — ED BEHAVIORAL HEALTH ASSESSMENT NOTE - NSBHATTESTCOMMENTATTENDFT_PSY_A_CORE
Pt is a 63 yr old  woman domiciled alone since the death of her , hx of depression + anxiety; past psych admission in early 2025 at [facility] and past [facility]  AOPD attendance, 5/24/2000 - 1/24/2001; unknown current outpatient provider but prescribed Lexapro and xanax 0.5mg PRN daily, denied any hx of illicit substance use including alcohol.  PMHx HTN, HLD, hx of gastritis; s/p EDG and s/p colonoscopy. Brought to ED by her son for worsening depression and anxiety and inability to take care of herself.  Patient has worsening depression and anxiety for 1.5 years, exacerbated further since leaving inpatient psych hospitalization at [facility] against medical advice <1 month ago. Patient has no history of any symptoms of psychosis and no current or past history of any suicidality or self harming behaviors.  Her son is concerned that her depression has become severe and pt is no longer able to care for herself because of her depression.  Pt signed voluntary admission papers.  Continue Lexapro 10mg   Ativan 1mg po q8hrs prn anxiety  Ativan 1mg po qhs Pt is a 64 yr old  woman domiciled alone since the death of her , hx of depression + anxiety; past psych admission in early 2025 at [facility] and past [facility]  AOPD attendance, 5/24/2000 - 1/24/2001; unknown current outpatient provider but prescribed Lexapro and xanax 0.5mg PRN daily, denied any hx of illicit substance use including alcohol.  PMHx HTN, HLD, hx of gastritis; s/p EDG and s/p colonoscopy. Brought to ED by her son for worsening depression and anxiety and inability to take care of herself.  Patient has worsening depression and anxiety for 1.5 years, exacerbated further since leaving inpatient psych hospitalization at [facility] against medical advice <1 month ago. Patient has no history of any symptoms of psychosis and no current or past history of any suicidality or self harming behaviors.  Her son is concerned that her depression has become severe and pt is no longer able to care for herself because of her depression.  Pt signed voluntary admission papers.  Continue Lexapro 10mg   Ativan 1mg po q8hrs prn anxiety  Ativan 1mg po qhs

## 2025-03-01 NOTE — ED ADULT NURSE REASSESSMENT NOTE - NS ED NURSE REASSESS COMMENT FT1
pt remains in behavioral control. VS repeated. 1:1 maintained for safety. Pt currently lying on stretcher comfortably. Pending urine sample. Pending bed placement.

## 2025-03-01 NOTE — ED BEHAVIORAL HEALTH ASSESSMENT NOTE - HPI (INCLUDE ILLNESS QUALITY, SEVERITY, DURATION, TIMING, CONTEXT, MODIFYING FACTORS, ASSOCIATED SIGNS AND SYMPTOMS)
63 yr old female, , domiciled alone and a retiree. self reports hx of depression + anxiety; past psych admission in early 2025 at [facility]; past [facility] AOPD attendance, 5/24/2000 - 1/24/2001; unknown current outpatient provider but prescribed lexapro and xanax PRN, denied any hx of illicit substance use including alcohol.  PMHx HTN, HLD, hx of gastritis; s/p EDG and s/p colonoscopy. Brought to ED by her son for worsening depression and anxiety and inability to take care of herself.    Patient accompanied by her son. She is depressed, anxious, and tearful. States that she needs help because her symptoms have continued to worsen since being discharged AMA from [facility] inpatient psych <1 month ago. In addition to depressed mood and anxiety, patient has had anhedonia, poor appetite (son reports that she primarily eats cereal so that she does not need to cook), insomnia, chronic abdominal discomfort, rarely leaves the home, and is having more difficulties attending to ADLs like cooking and managing finances. Patient has not had symptoms of ashli nor psychosis. Patient is not having SI nor HI.     Patient says her symptoms first started to worsen approximately 1.5 years ago after her PCP discontinued escitalopram 5 mg, which she was previously stable on for >5 years. Attempted to restart the medication, but she was no longer having significant response. Has had trials of duloxetine and desvenlafaxine which were also ineffective. Patient now on escitalopram 10 mg daily. She has also been taking benzodiazepines chronically. Currently uses alprazolam 0.25 to 0.5 mg daily PRN (per son, typically uses it 5/7 days a week). Her doctors have previously tried to transition her to clonazepam, which the patient felt was "bad". Patient says that she has multiple doctors, but she is unsure of their names. At the urging of her daughter-in-law, the patient has also been seeing a holistic provider that diagnosed her with "Lyme disease". Also saw a neurologist recently with a negative dementia workup per the patient and her son.    Patient has other stressors in her life, including the death of her  12 years ago, falling out with all of her friends (who live in [place]), difficulties with a condo that she purchased despite not wanting to own the building, and her son's cancer diagnosis in 2024.    Patient is requesting voluntary admission to inpatient psychiatry. 63 yr old female, , domiciled alone and a retiree, self reports hx of depression + anxiety; past psych admission in early 2025 at [facility]; past [facility] AOPD attendance, 5/24/2000 - 1/24/2001; unknown current outpatient provider but prescribed Lexapro and xanax PRN, denied any hx of illicit substance use including alcohol.  PMHx HTN, HLD, hx of gastritis; s/p EDG and s/p colonoscopy. Brought to ED by her son for worsening depression and anxiety and inability to take care of herself.    Patient accompanied by her son. She is depressed, anxious, and tearful. States that she needs help because her symptoms have continued to worsen since being discharged AMA from [facility] inpatient psych <1 month ago. In addition to depressed mood and anxiety, patient has had anhedonia, poor appetite (son reports that she primarily eats cereal so that she does not need to cook), insomnia, chronic abdominal discomfort, rarely leaves the home, and is having more difficulties attending to ADLs like cooking and managing finances. Patient has not had symptoms of ashli nor psychosis. Patient is not having SI nor HI.     Patient says her symptoms first started to worsen approximately 1.5 years ago after her PCP discontinued escitalopram 5 mg, which she was previously stable on for >5 years. Attempted to restart the medication, but she was no longer having significant response. Has had trials of duloxetine and desvenlafaxine which were also ineffective. Patient now on escitalopram 10 mg daily. She has also been taking benzodiazepines chronically. Currently uses alprazolam 0.25 to 0.5 mg daily PRN (per son, typically uses it 5/7 days a week). Her doctors have previously tried to transition her to clonazepam, which the patient felt was "bad". Patient says that she has multiple doctors, but she is unsure of their names. At the urging of her daughter-in-law, the patient has also been seeing a holistic provider that diagnosed her with "Lyme disease". Also saw a neurologist recently with a negative dementia workup per the patient and her son.    Patient has other stressors in her life, including the death of her  12 years ago, falling out with all of her friends (who live in [place]), difficulties with a condo that she purchased despite not wanting to own the building, and her son's renal cancer diagnosis in 2024.    Patient is requesting voluntary admission to inpatient psychiatry. 64 yr old female, , domiciled alone and a retiree, self reports hx of depression + anxiety; past psych admission in early 2025 at [facility]; past [facility] AOPD attendance, 5/24/2000 - 1/24/2001; unknown current outpatient provider but prescribed Lexapro and xanax PRN, denied any hx of illicit substance use including alcohol.  PMHx HTN, HLD, hx of gastritis; s/p EDG and s/p colonoscopy. Brought to ED by her son for worsening depression and anxiety and inability to take care of herself.    Patient accompanied by her son. She is depressed, anxious, and tearful. States that she needs help because her symptoms have continued to worsen since being discharged AMA from [facility] inpatient psych <1 month ago. In addition to depressed mood and anxiety, patient has had anhedonia, poor appetite (son reports that she primarily eats cereal so that she does not need to cook), insomnia, chronic abdominal discomfort, rarely leaves the home, and is having more difficulties attending to ADLs like cooking and managing finances. Patient has not had symptoms of ashli nor psychosis. Patient is not having SI nor HI.     Patient says her symptoms first started to worsen approximately 1.5 years ago after her PCP discontinued escitalopram 5 mg, which she was previously stable on for >5 years. Attempted to restart the medication, but she was no longer having significant response. Has had trials of duloxetine and desvenlafaxine which were also ineffective. Patient now on escitalopram 10 mg daily. She has also been taking benzodiazepines chronically. Currently uses alprazolam 0.25 to 0.5 mg daily PRN (per son, typically uses it 5/7 days a week). Her doctors have previously tried to transition her to clonazepam, which the patient felt was "bad". Patient says that she has multiple doctors, but she is unsure of their names. At the urging of her daughter-in-law, the patient has also been seeing a holistic provider that diagnosed her with "Lyme disease". Also saw a neurologist recently with a negative dementia workup per the patient and her son.    Patient has other stressors in her life, including the death of her  12 years ago, falling out with all of her friends (who live in [place]), difficulties with a condo that she purchased despite not wanting to own the building, and her son's renal cancer diagnosis in 2024.    Patient is requesting voluntary admission to inpatient psychiatry.

## 2025-03-01 NOTE — ED ADULT NURSE REASSESSMENT NOTE - NS ED NURSE REASSESS COMMENT FT1
pt remains in control. Son at bedside, pt evaluated by psych. Awaiting dispo. VS repeated. 1:1 maintained for safety

## 2025-03-01 NOTE — ED PROVIDER NOTE - ATTENDING CONTRIBUTION TO CARE
I have personally performed a face to face medical and diagnostic evaluation of the patient. I have discussed with and reviewed the Resident's and/or ACP's and/or Medical/PA/NP student's note and agree with the History, ROS, Physical Exam and MDM unless otherwise indicated. A brief summary of my personal evaluation and impression can be found below.     64-year-old female past medical history gastritis, depression on Lexapro 10 mg daily and uses Xanax intermittently for anxiety, recent psychiatric hospitalization a few weeks ago for a few days due to severe depression presenting to the emergency department for worsening depression anxiety, patient with decreased appetite, unable to take care of herself, patient with a lot of paranoia and ruminating thoughts,  family is concerned that she needs hospitalization to get better management with medication of her psychiatric illness.  Family states she is not able to participate in her usual daily activities.  Patient denies SI, HI or hallucinations.  Patient very tearful during interview.  Patient states every morning she wakes up with a pit in her stomach which then resulted in her getting anxious and depressed. Family doesn't endorse any confusion. No cp or sob. No abd pain n/v at this time.    GENERAL: Awake. Alert. NAD. Well nourished.  HEENT: NC/AT, EOMI, Conjunctiva pink, no scleral icterus. Airway patent.   LUNGS: CTAB. No wheezes or rales noted.  CARDIAC:  RRR.  ABDOMEN: Soft, NT, ND, no rebound, no guarding.  EXT: No edema, no calf tenderness, distal pulses 2+ bilaterally  NEURO: A&Ox3. Moving all extremities.  SKIN: Warm and dry.   PSYCH: Tearful. psychomotor agitation, rocking back and forth.    Given hx and physical, ddx includes but is not limited to depression, anxiety, Low concern for medical origin for sx (pt seen by GI and other doctors, states normal thyroid, otherwise just has gastritis as per her doctors, had medical workup outpt). Plan for labs, ecg, ua, psych eval, reassess.

## 2025-03-01 NOTE — ED BEHAVIORAL HEALTH ASSESSMENT NOTE - SUMMARY
63 yr old female, , domiciled alone and a retiree. self reports hx of depression + anxiety; past psych admission in early 2025 at [facility]; past [facility] A[facility]D attendance, 5/24/2000 - 1/24/2001; unknown current outpatient provider but prescribed lexapro and xanax PRN, denied any hx of illicit substance use including alcohol.  PMHx HTN, HLD, hx of gastritis; s/p EDG and s/p colonoscopy. Brought to ED by her son for worsening depression and anxiety and inability to take care of herself.    Patient with worsening depression and anxiety for 1.5 years, exacerbated further since leaving inpatient psych hospitalization at [facility] against medical advice <1 month ago. Patient does not have symptoms of psychosis. She is not having SI/HI and denies past SI, SA, and NSSIB. Patient does not meet criteria for involuntary psychiatric hospitalization, but is requesting voluntary admission.    RECOMMENDATIONS:   - Patient to be admitted to inpatient psychiatry on 9.13 pending bed availability  - Patient has signed voluntary legals  - Continue home escitalopram 10 mg daily  - Lorazepam 1 mg q8h PRN for anxiety 63 yr old female, , domiciled alone since the death of her , hx of depression + anxiety; past psych admission in early 2025 at [facility] and past [facility]  AOPD attendance, 5/24/2000 - 1/24/2001; unknown current outpatient provider but prescribed Lexapro and xanax 0.5mg PRN daily, denied any hx of illicit substance use including alcohol.  PMHx HTN, HLD, hx of gastritis; s/p EDG and s/p colonoscopy. Brought to ED by her son for worsening depression and anxiety and inability to take care of herself.    Patient with worsening depression and anxiety for 1.5 years, exacerbated further since leaving inpatient psych hospitalization at [facility] against medical advice <1 month ago. Patient does not have symptoms of psychosis. She is not having SI/HI and denies past SI, SA, and NSSIB. Patient does not meet criteria for involuntary psychiatric hospitalization, but is requesting voluntary admission.    RECOMMENDATIONS:   - Patient to be admitted to inpatient psychiatry on 9.13 pending bed availability  - Patient has signed voluntary legals  - Continue home escitalopram 10 mg daily  - Lorazepam 1 mg q8h PRN for anxiety 64 yr old female, , domiciled alone since the death of her , hx of depression + anxiety; past psych admission in early 2025 at [facility] and past [facility]  AOPD attendance, 5/24/2000 - 1/24/2001; unknown current outpatient provider but prescribed Lexapro and xanax 0.5mg PRN daily, denied any hx of illicit substance use including alcohol.  PMHx HTN, HLD, hx of gastritis; s/p EDG and s/p colonoscopy. Brought to ED by her son for worsening depression and anxiety and inability to take care of herself.    Patient with worsening depression and anxiety for 1.5 years, exacerbated further since leaving inpatient psych hospitalization at [facility] against medical advice <1 month ago. Patient does not have symptoms of psychosis. She is not having SI/HI and denies past SI, SA, and NSSIB. Patient does not meet criteria for involuntary psychiatric hospitalization, but is requesting voluntary admission. Her son is requesting a call  [phone] when his mother is being transferred.     RECOMMENDATIONS:   - Patient to be admitted to inpatient psychiatry on 9.13 pending bed availability  - Patient has signed voluntary legals  - Continue home escitalopram 10 mg daily  - Lorazepam 1 mg q8h PRN for anxiety

## 2025-03-01 NOTE — ED BEHAVIORAL HEALTH ASSESSMENT NOTE - DESCRIPTION
HTN, HLD, gastritis; s/p EGD, s/p colonoscopy. Hep C, s/p treatment in 2013.  for 36 yrs until her   on 2013. 3 sons (2 sons living in [place]; another son based in [place]); has 4 grandchildren (3 girls, 1 boys).  retired school staff (since 5 yrs ago). originally from [place]. likes cooking, cleaning her house, being with friends, shopping, and gardening.  is Gnosticism. no reported access to guns Patient brought to ED by son for worsening depression and anxiety. Initial labs WNL aside from mildly elevated glucose. Physical exam largely WNL. Did not receive PRNs. originally from [place] 15,  for 36 yrs until her   on 2013 .Pt has 3 sons (2 sons living in [place]; another son based in [place]); has 4 grandchildren (3 girls, 1 boys).  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  is Cheondoism. no reported access to guns

## 2025-03-01 NOTE — ED BEHAVIORAL HEALTH ASSESSMENT NOTE - RISK ASSESSMENT
Risk Assessment:  Modifiable risk factors: depression, anxiety  Unmodifiable risk factors: elderly female, hx of depression/ anxiety, not in psych care for a long while now   Protective factors: currently NO ACTIVE OBJECTIVE findings of acute suicidality, no reported hx of SA nor any NSSIB, no family hx of SA, Pt's sense of responsibility to family, positive therapeutic relationship with family, social supports, no reported access to guns, no chronic pains, Restoration, currently not acutely manic, not psychotic, not intoxicated or in withdrawal, no pending legal issues    Given above, the Pt is currently NOT in acute imminent risk of self harm but is at chronically elevated risk of self-harm. She does not meet criteria for involuntary hospitalization, but is requesting voluntary admission to inpatient psychiatry.

## 2025-03-01 NOTE — ED BEHAVIORAL HEALTH ASSESSMENT NOTE - PAST PSYCHOTROPIC MEDICATION
Lexapro  Reports her current xanax use from 0.25mg to 0.5mg daily PRN to current 0.5mg daily.  Also on clonazepam previously which patient felt was "bad".

## 2025-03-01 NOTE — ED BEHAVIORAL HEALTH ASSESSMENT NOTE - DETAILS
Discussed at bedside with patient and son. Patient notes being traumatized by her previous tenant dying from cancer. Admitted to [facility] for depression. Left AMA after 3 days. Contact information not available at this time. N/A none

## 2025-03-01 NOTE — ED ADULT NURSE NOTE - NSFALLUNIVINTERV_ED_ALL_ED
Instruct patient to call for assistance before getting out of bed/chair/stretcher/Non-slip footwear applied when patient is off stretcher/Physically safe environment - no spills, clutter or unnecessary equipment/Purposeful proactive rounding

## 2025-03-01 NOTE — ED PROVIDER NOTE - PROGRESS NOTE DETAILS
pt seen by psych. plan for voluntary admission. pt agrees. bh observation. please refer to observation notes for further updates

## 2025-03-01 NOTE — ED BEHAVIORAL HEALTH ASSESSMENT NOTE - PRIMARY DX
MDD (major depressive disorder) Severe episode of recurrent major depressive disorder, without psychotic features

## 2025-03-01 NOTE — ED ADULT NURSE NOTE - OBJECTIVE STATEMENT
Pt is a 63yo F self presenting to ED with son complaining of persistent depression. Pt reports being hospitalized at [facility] for three days but found it unhelpful. Pt reports dealing with this for years, and recently has been not been able to function independently (pt lives alone). Pt also complains of anxiety and that she took 2 Xanax for it. Pt denies SI/HI/AVH/substance use. Pt changed into gowns and wanded, son held on to valuables and security took clothes.

## 2025-03-01 NOTE — ED ADULT NURSE NOTE - NS_ED_NURSE_RECEIVING_FACILITY _ED_ALL_ED
Diabetes: Learning About Serving and Portion Sizes     A good rule of thumb: Devote half your plate to vegetables and green salad. Split the other half between protein and starchy carbohydrates. Fruit makes a good dessert.     Servings and portions. Whats the difference? These terms can be very confusing. But learning to measure serving sizes can help you figure out how many carbohydrates (carbs) and other foods you eat each day. They are also powerful tools for managing your weight.  Servings and portions  Many different words are used to describe amounts of food. If your health care provider uses a term youre not sure of, dont be afraid to ask. It helps to know the difference between servings and portions:  · A serving size is a fixed size. Food producers use this term to describe their products. For example, the label on a cereal box could say that 1 cup of dry cereal = 1 serving.  · A portion (also called a helping) is how much you eat or how much you put on your plate at a meal. For example, you might eat 2 cups of cereal at breakfast.  Using serving information  The portion you choose to eat (such as 2 cups of cereal) may be more than one serving as listed on the food label (such as 1 cup of cereal). Thats why it helps to measure or weigh the food you eat. Because the food label values are based on servings, youll need to know how many servings you eat at one sitting.     Ounces: 2 to 3 ounces is about the size of your palm.       1 Cup: 1 cup (or a medium-sized piece) is about the size of your fist.       1/2 Cup: 1/2 cup is about the size of your cupped hand.      One tablespoon is about the size of your thumb.  One teaspoon is about the size of the tip of your thumb.  Keeping track of serving sizes  When youre planning for a snack or a meal, keep servings in mind. If you dont have measuring cups or a scale handy, there are ways to eyeball serving sizes, such as comparing your food to the size  of your hand (see pictures above).  Managing portion sizes  If your weight is a concern, reducing your portions can help. You can eat more than one serving of a food at once. But to keep from eating too much at one meal, learn how to manage your portions. A portion is the amount of each type of food on your plate. See the plate diagram for an example of balanced portions.  Date Last Reviewed: 3/1/2016  © 6465-8027 [employer]. [address] [place] [address], [place] [address]. All rights reserved. This information is not intended as a substitute for professional medical care. Always follow your healthcare professional's instructions.         [facility]

## 2025-03-01 NOTE — ED ADULT NURSE NOTE - CHIEF COMPLAINT QUOTE
Anxiety and depression. Went to [facility] a couple of weeks ago and was admitted however signed out.   No hallucinations, Denies SI/HI and no hx of SI.   Takes 10mg Lexapro and xanax prn.

## 2025-03-02 VITALS
HEART RATE: 72 BPM | TEMPERATURE: 98 F | SYSTOLIC BLOOD PRESSURE: 125 MMHG | DIASTOLIC BLOOD PRESSURE: 75 MMHG | OXYGEN SATURATION: 100 % | RESPIRATION RATE: 20 BRPM

## 2025-03-02 LAB
AMPHET UR-MCNC: NEGATIVE — SIGNIFICANT CHANGE UP
APPEARANCE UR: ABNORMAL
BACTERIA # UR AUTO: ABNORMAL /HPF
BARBITURATES UR SCN-MCNC: NEGATIVE — SIGNIFICANT CHANGE UP
BENZODIAZ UR-MCNC: POSITIVE
BILIRUB UR-MCNC: NEGATIVE — SIGNIFICANT CHANGE UP
CAST: 0 /LPF — SIGNIFICANT CHANGE UP (ref 0–4)
COCAINE METAB.OTHER UR-MCNC: NEGATIVE — SIGNIFICANT CHANGE UP
COLOR SPEC: YELLOW — SIGNIFICANT CHANGE UP
DIFF PNL FLD: ABNORMAL
GLUCOSE UR QL: NEGATIVE MG/DL — SIGNIFICANT CHANGE UP
KETONES UR-MCNC: NEGATIVE MG/DL — SIGNIFICANT CHANGE UP
LEUKOCYTE ESTERASE UR-ACNC: ABNORMAL
METHADONE UR-MCNC: NEGATIVE — SIGNIFICANT CHANGE UP
NITRITE UR-MCNC: NEGATIVE — SIGNIFICANT CHANGE UP
OPIATES UR-MCNC: NEGATIVE — SIGNIFICANT CHANGE UP
OXYCODONE UR-MCNC: NEGATIVE — SIGNIFICANT CHANGE UP
PCP SPEC-MCNC: SIGNIFICANT CHANGE UP
PCP UR-MCNC: NEGATIVE — SIGNIFICANT CHANGE UP
PH UR: 5.5 — SIGNIFICANT CHANGE UP (ref 5–8)
PROT UR-MCNC: NEGATIVE MG/DL — SIGNIFICANT CHANGE UP
RBC CASTS # UR COMP ASSIST: 3 /HPF — SIGNIFICANT CHANGE UP (ref 0–4)
SP GR SPEC: 1.02 — SIGNIFICANT CHANGE UP (ref 1–1.03)
SQUAMOUS # UR AUTO: 11 /HPF — HIGH (ref 0–5)
THC UR QL: NEGATIVE — SIGNIFICANT CHANGE UP
UROBILINOGEN FLD QL: 0.2 MG/DL — SIGNIFICANT CHANGE UP (ref 0.2–1)
WBC UR QL: 94 /HPF — HIGH (ref 0–5)

## 2025-03-02 PROCEDURE — 36415 COLL VENOUS BLD VENIPUNCTURE: CPT

## 2025-03-02 PROCEDURE — 93005 ELECTROCARDIOGRAM TRACING: CPT

## 2025-03-02 PROCEDURE — 99285 EMERGENCY DEPT VISIT HI MDM: CPT | Mod: 25

## 2025-03-02 PROCEDURE — 80053 COMPREHEN METABOLIC PANEL: CPT

## 2025-03-02 PROCEDURE — 85025 COMPLETE CBC W/AUTO DIFF WBC: CPT

## 2025-03-02 PROCEDURE — 84443 ASSAY THYROID STIM HORMONE: CPT

## 2025-03-02 PROCEDURE — 81001 URINALYSIS AUTO W/SCOPE: CPT

## 2025-03-02 PROCEDURE — 99212 OFFICE O/P EST SF 10 MIN: CPT

## 2025-03-02 PROCEDURE — 80307 DRUG TEST PRSMV CHEM ANLYZR: CPT

## 2025-03-02 PROCEDURE — 87637 SARSCOV2&INF A&B&RSV AMP PRB: CPT

## 2025-03-02 PROCEDURE — G0378: CPT

## 2025-03-02 PROCEDURE — 82746 ASSAY OF FOLIC ACID SERUM: CPT

## 2025-03-02 PROCEDURE — 99223 1ST HOSP IP/OBS HIGH 75: CPT

## 2025-03-02 PROCEDURE — 82607 VITAMIN B-12: CPT

## 2025-03-02 RX ORDER — LOSARTAN POTASSIUM 100 MG/1
50 TABLET, FILM COATED ORAL ONCE
Refills: 0 | Status: COMPLETED | OUTPATIENT
Start: 2025-03-02 | End: 2025-03-02

## 2025-03-02 RX ORDER — AMLODIPINE BESYLATE 10 MG/1
5 TABLET ORAL ONCE
Refills: 0 | Status: COMPLETED | OUTPATIENT
Start: 2025-03-02 | End: 2025-03-02

## 2025-03-02 RX ADMIN — Medication 1 MILLIGRAM(S): at 07:54

## 2025-03-02 RX ADMIN — ESCITALOPRAM OXALATE 10 MILLIGRAM(S): 20 TABLET ORAL at 13:51

## 2025-03-02 RX ADMIN — AMLODIPINE BESYLATE 5 MILLIGRAM(S): 10 TABLET ORAL at 07:54

## 2025-03-02 RX ADMIN — LOSARTAN POTASSIUM 50 MILLIGRAM(S): 100 TABLET, FILM COATED ORAL at 07:54

## 2025-03-02 NOTE — DISCHARGE NOTE NURSING/CASE MANAGEMENT/SOCIAL WORK - NSFLUVACAGEDISCH_IMM_ALL_CORE
Patient Seen in: [facility] [place]    History   Patient presents with:  Fever (infectious)  Neck Pain (musculoskeletal, neurologic)    Stated Complaint: rash, neck pain, fever, chills    HPI    23year-old female presents to the e normal.  Oral mucosa is moist.  Tonsils are prominent. Neck: There is anterior cervical adenopathy without thyromegaly, hoarseness or stridor. Lungs are clear to auscultation. Heart exam: Normal S1-S2 without extra sounds or murmurs.   Regular rate and r Please view results for these tests on the individual orders.    ARBOVIRUS/WEST NILE AB PANEL   SPOTTED FEVER GROUP AB,IGG/IGM   LYME DISEASE, TOTAL AB W RFLX   BLOOD CULTURE   BLOOD CULTURE     The patient's mother indicated that the patient had a flul Adult

## 2025-03-02 NOTE — ED ADULT NURSE REASSESSMENT NOTE - NS ED NURSE REASSESS COMMENT FT1
Report received from TORY Boroks. PT is resting comfortably in bed, breathing unlabored on room air, and speaking in complete sentences. PT remains on 1:1 for risk of self-harm. Updated PT on plan of care. PT voluntarily admitted to psych, awaiting a bed. Safety and comfort maintained. Report received from TORY Brooks. PT is resting comfortably in bed, breathing unlabored on room air, and speaking in complete sentences. PT remains on 1:1 for risk of self-harm. Updated PT on plan of care. PT voluntarily admitted to psych, awaiting a bed. Voluntary paperwork in the chart and patient belonging sheet in the chart. No valuables locked at the red desk per report from night shift RN. PT aware we are awaiting a urine sample. Safety and comfort maintained.

## 2025-03-02 NOTE — ED BEHAVIORAL HEALTH PROGRESS NOTE - NSBHATTESTCOMMENTATTENDFT_PSY_A_CORE
Pt is a 64 yr old  woman domiciled alone since the death of her , hx of depression + anxiety; past psych admission in early 2025 at [facility] and past [facility]  [facility] attendance, 5/24/2000 - 1/24/2001; unknown current outpatient provider but prescribed Lexapro and xanax 0.5mg PRN daily, denied any hx of illicit substance use including alcohol.  PMHx HTN, HLD, hx of gastritis; s/p EDG and s/p colonoscopy. Brought to ED by her son for worsening depression and anxiety and inability to take care of herself.  Patient has worsening depression and anxiety for 1.5 years, exacerbated further since leaving inpatient psych hospitalization at [facility] against medical advice <1 month ago. Patient has no history of any symptoms of psychosis and no current or past history of any suicidality or self harming behaviors.  Her son is concerned that her depression has become severe and pt is no longer able to care for herself because of her depression.  Pt signed voluntary admission papers.  Continue Lexapro 10mg   Ativan 1mg po q8hrs prn anxiety  Ativan 1mg po qhs

## 2025-03-02 NOTE — ED BEHAVIORAL HEALTH PROGRESS NOTE - SUMMARY
64 yr old female, , domiciled alone since the death of her , hx of depression + anxiety; past psych admission in early 2025 at [facility] and past [facility]  [facility] attendance, 5/24/2000 - 1/24/2001; unknown current outpatient provider but prescribed Lexapro and xanax 0.5mg PRN daily, denied any hx of illicit substance use including alcohol.  PMHx HTN, HLD, hx of gastritis; s/p EDG and s/p colonoscopy. Brought to ED by her son for worsening depression and anxiety and inability to take care of herself.    Patient with worsening depression and anxiety for 1.5 years, exacerbated further since leaving inpatient psych hospitalization at [facility] against medical advice <1 month ago. Patient does not have symptoms of psychosis. She is not having SI/HI and denies past SI, SA, and NSSIB. Patient does not meet criteria for involuntary psychiatric hospitalization, but is requesting voluntary admission. Her son is requesting a call  [phone] when his mother is being transferred.     RECOMMENDATIONS:   - Patient to be admitted to inpatient psychiatry on 9.13 pending bed availability  - Patient has signed voluntary legals  - Continue home escitalopram 10 mg daily  - Lorazepam 1 mg q8h PRN for anxiety

## 2025-03-02 NOTE — DISCHARGE NOTE NURSING/CASE MANAGEMENT/SOCIAL WORK - NSDCPEFALRISK_GEN_ALL_CORE
For information on Fall & Injury Prevention, visit: [email or web address] OR  [email or web address] OR  https://www.cdc.gov/steadi/patient.html

## 2025-03-02 NOTE — ED CDU PROVIDER DISPOSITION NOTE - CLINICAL COURSE
ED team medically cleared and dispositioned to CDU for eval by psychiatry.  Psychiatry team, Dr. Urbina, has seen patient and agree with admit to psych facility

## 2025-03-02 NOTE — ED CDU PROVIDER INITIAL DAY NOTE - ATTENDING CONTRIBUTION TO CARE
I, Armando Tovar, performed a history and physical exam of the patient and discussed their management with the resident provider. I reviewed the resident provider's note and agree with the documented findings and plan of care. I was present and available for all procedures.    64-year-old female past medical history gastritis, depression on Lexapro 10 mg daily and uses Xanax intermittently for anxiety, recent psychiatric hospitalization a few weeks ago for a few days due to severe depression presenting to the emergency department for worsening depression anxiety, patient with decreased appetite, unable to take care of herself, patient with a lot of paranoia and ruminating thoughts,  family is concerned that she needs hospitalization to get better management with medication of her psychiatric illness.  Family states she is not able to participate in her usual daily activities.  Patient denies SI, HI or hallucinations.  Patient very tearful during interview.  Patient states every morning she wakes up with a pit in her stomach which then resulted in her getting anxious and depressed. Family doesn't endorse any confusion. No cp or sob. No abd pain n/v at this time.    Gen: Well appearing and in NAD  Head: normal appearing atraumatic   Neck: trachea midline  Resp:  No respiratory distress  Abd; soft NT ND  Ext: no visible deformities  Neuro:  Alert and oriented, appears non focal  Skin:  Warm and dry as visualized  PSYCH: Tearful. psychomotor agitation, rocking back and forth.    Given hx and physical, ddx includes but is not limited to depression, anxiety, Low concern for medical origin for sx (pt seen by GI and other doctors, states normal thyroid, otherwise just has gastritis as per her doctors, had medical workup outpt). Plan for labs, ecg, ua, psych eval, reassess.

## 2025-03-02 NOTE — CHART NOTE - NSCHARTNOTEFT_GEN_A_CORE
ED ;     Social work continues to follow patient In ED for voluntary inpatient psychiatric transfer. As per medical team patient is medically cleared to transfer. Legals completed and present in chart.      LMSW contacted the following hospitals to inquire about bed availability:     Social work called [facility] and spoke with Bella who confirmed they have female beds available. Social work sent over the legals and the EKG for review.     LMSW received call from Estella from [facility] indicating bed availability. Estella requested legals and LMSW faxed legals and EKG. Estella reported that the patient is accepted to [facility] with Dr. Aj as the accepting provider. LMSW notified the ED team, patient's RN, and Psychiatry of acceptance. LMSW met patient and her son, Dejuan([phone]) at bedside. LMSW informed them of acceptance and provided the information to the hospital to follow up regarding visiting hours. Also, LMSW provided copy of mental hygiene law. Dejuan requested social work to call him when patient leaves so he can follow up at [facility] to provide her with clothing. Patient and son in agreement. All parties in agreement with dispo.     LMSW created a transfer packet containing facesheet, legals, EKG, MD progress notes,  progress notes, labs, transfer papers, and non-emergent form. LMSW placed transfer packet on patient's chart. LMSW provided RN with number to contact [facility] for RN report. LMSW contacted [facility] and arranged S transportation. LMSW requested next available and placed trip on hold. LMSW provided  EMS number to RN to answer the  questions. Trip was confirmed. LMSW contacted [facility] and provided the expected pick-up time. Patient with [employer] medical insurance requiring authorization:     Authorization secured and approved for 3 days:   Auth #: [id number]  Next Review date: 03/04/2025  CM: TBD    Authorization was provided by Jac. Jac also reported that the authorization provided includes transportation.     LMSW sent telepsychiatry email and [facility] email with authorization information. No further concerns at present regarding transfer at present. Social work remains available.

## 2025-03-02 NOTE — ED ADULT NURSE REASSESSMENT NOTE - NS ED NURSE REASSESS COMMENT FT1
2302: Handoff taken from TORY Angelo/ adalberto in green. Introduced self to pt, resting in bed, VSS. Awaiting psych placement, declined xanax at this time, updated on POC. Comfort and safety maintained at this time.

## 2025-03-02 NOTE — ED BEHAVIORAL HEALTH PROGRESS NOTE - CASE SUMMARY/FORMULATION (CLEARLY DOCUMENT RATIONALE FOR DISPOSITION CHANGE)
Pt is a 64 yr old  woman domiciled alone since the death of her , hx of depression + anxiety; past psych admission in early 2025 at [facility] and past [facility]  [facility] attendance, 5/24/2000 - 1/24/2001; unknown current outpatient provider but prescribed Lexapro and xanax 0.5mg PRN daily, denied any hx of illicit substance use including alcohol.  PMHx HTN, HLD, hx of gastritis; s/p EDG and s/p colonoscopy. Brought to ED by her son for worsening depression and anxiety and inability to take care of herself.  Patient has worsening depression and anxiety for 1.5 years, exacerbated further since leaving inpatient psych hospitalization at [facility] against medical advice <1 month ago. Patient has no history of any symptoms of psychosis and no current or past history of any suicidality or self harming behaviors.  Her son is concerned that her depression has become severe and pt is no longer able to care for herself because of her depression.  Pt signed voluntary admission papers.  Continue Lexapro 10mg   Ativan 1mg po q8hrs prn anxiety  Ativan 1mg po qhs

## 2025-03-02 NOTE — DISCHARGE NOTE NURSING/CASE MANAGEMENT/SOCIAL WORK - PATIENT PORTAL LINK FT
You can access the FollowMyHealth Patient Portal offered by [facility] by registering at the following website: [email or web address]. By joining [employer]’s FollowMyHealth portal, you will also be able to view your health information using other applications (apps) compatible with our system.

## 2025-03-02 NOTE — ED CDU PROVIDER DISPOSITION NOTE - ATTENDING CONTRIBUTION TO CARE
Emergency Medicine Attending MD Walker:  I have personally performed a face to face diagnostic evaluation on this patient with the resident.  I have reviewed the ACP note and agree with the history, exam, and plan of care, except as noted.  History and Exam by me shows a     64-year-old female past medical history gastritis, depression on Lexapro 10 mg daily and uses Xanax intermittently for anxiety, recent psychiatric hospitalization a few weeks ago for a few days due to severe depression presenting to the emergency department for worsening depression anxiety, patient with decreased appetite, unable to take care of herself, patient with a lot of paranoia and ruminating thoughts,  family is concerned that she needs hospitalization to get better management with medication of her psychiatric illness.      ED team medically cleared and dispositioned to CDU for eval by psychiatry.  Psychiatry team has seen patient and agree with admit to psych facility    VS: wnl  Gen: Well appearing and in NAD  Head: well-appearing adult F  Neck: trachea midline  Resp:  No respiratory distress  Abd; soft NT ND  Ext: no visible deformities  Neuro:  Alert and oriented, appears non focal.    Skin:  Warm and dry as visualized  PSYCH: Tearful. no psychomotor agitation, denies SI/HI at this time.    MDM:    Clinically well at this time.    Seen by Psychiatry who agree with admit for depression/SI  [facility], and can treat for both of the above.

## 2025-03-02 NOTE — DISCHARGE NOTE NURSING/CASE MANAGEMENT/SOCIAL WORK - FINANCIAL ASSISTANCE
[facility] provides services at a reduced cost to those who are determined to be eligible through [facility]’s financial assistance program. Information regarding [facility]’s financial assistance program can be found by going to [email or web address] or by calling [phone].

## 2025-03-02 NOTE — ED ADULT NURSE REASSESSMENT NOTE - NS ED NURSE REASSESS COMMENT FT1
Report received from Nimisha SPEARS. Pt AXOX4 breathing unlabored on room air and speaking in complete sentences. Pt updated on plan of care; on voluntary psych . Safety and comfort measures maintained. Bed in lowest position. Bed rails in appropriate positions. 1:1 at bedside for safety. Constant obvs maintained as per MD order. Safety and comfort measures maintained.

## 2025-03-02 NOTE — ED BEHAVIORAL HEALTH PROGRESS NOTE - RISK ASSESSMENT
Risk Assessment:  Modifiable risk factors: depression, anxiety  Unmodifiable risk factors: elderly female, hx of depression/ anxiety, not in psych care for a long while now   Protective factors: currently NO ACTIVE OBJECTIVE findings of acute suicidality, no reported hx of SA nor any NSSIB, no family hx of SA, Pt's sense of responsibility to family, positive therapeutic relationship with family, social supports, no reported access to guns, no chronic pains, Voodoo, currently not acutely manic, not psychotic, not intoxicated or in withdrawal, no pending legal issues    Given above, the Pt is currently NOT in acute imminent risk of self harm but is at chronically elevated risk of self-harm. She does not meet criteria for involuntary hospitalization, but is requesting voluntary admission to inpatient psychiatry.

## 2025-06-17 ENCOUNTER — NON-APPOINTMENT (OUTPATIENT)
Age: 65
End: 2025-06-17

## 2025-06-19 ENCOUNTER — APPOINTMENT (OUTPATIENT)
Dept: GASTROENTEROLOGY | Facility: CLINIC | Age: 65
End: 2025-06-19
Payer: MEDICARE

## 2025-06-19 VITALS
SYSTOLIC BLOOD PRESSURE: 128 MMHG | HEIGHT: 60 IN | TEMPERATURE: 97.7 F | OXYGEN SATURATION: 97 % | BODY MASS INDEX: 31.8 KG/M2 | DIASTOLIC BLOOD PRESSURE: 74 MMHG | HEART RATE: 60 BPM | WEIGHT: 162 LBS

## 2025-06-19 PROCEDURE — 99204 OFFICE O/P NEW MOD 45 MIN: CPT

## 2025-06-23 LAB
ALBUMIN SERPL ELPH-MCNC: 4.5 G/DL
ALP BLD-CCNC: 85 U/L
ALT SERPL-CCNC: 21 U/L
AMYLASE/CREAT SERPL: 47 U/L
ANION GAP SERPL CALC-SCNC: 16 MMOL/L
AST SERPL-CCNC: 24 U/L
BILIRUB SERPL-MCNC: 0.9 MG/DL
BUN SERPL-MCNC: 13 MG/DL
CALCIUM SERPL-MCNC: 9.8 MG/DL
CHLORIDE SERPL-SCNC: 103 MMOL/L
CO2 SERPL-SCNC: 23 MMOL/L
CREAT SERPL-MCNC: 0.74 MG/DL
EGFRCR SERPLBLD CKD-EPI 2021: 89 ML/MIN/1.73M2
GLUCOSE SERPL-MCNC: 86 MG/DL
HCT VFR BLD CALC: 38.2 %
HGB BLD-MCNC: 12.5 G/DL
LPL SERPL-CCNC: 18 U/L
MCHC RBC-ENTMCNC: 29.8 PG
MCHC RBC-ENTMCNC: 32.7 G/DL
MCV RBC AUTO: 91 FL
PLATELET # BLD AUTO: 273 K/UL
POTASSIUM SERPL-SCNC: 4.2 MMOL/L
PROT SERPL-MCNC: 7 G/DL
RBC # BLD: 4.2 M/UL
RBC # FLD: 12.9 %
SODIUM SERPL-SCNC: 142 MMOL/L
WBC # FLD AUTO: 6.99 K/UL

## 2025-06-23 RX ORDER — OMEPRAZOLE 40 MG/1
40 CAPSULE, DELAYED RELEASE ORAL DAILY
Qty: 60 | Refills: 0 | Status: ACTIVE | COMMUNITY
Start: 2025-06-23 | End: 1900-01-01

## 2025-06-24 ENCOUNTER — OUTPATIENT (OUTPATIENT)
Dept: OUTPATIENT SERVICES | Facility: HOSPITAL | Age: 65
LOS: 1 days | End: 2025-06-24
Payer: MEDICARE

## 2025-06-24 ENCOUNTER — APPOINTMENT (OUTPATIENT)
Dept: ULTRASOUND IMAGING | Facility: CLINIC | Age: 65
End: 2025-06-24
Payer: MEDICARE

## 2025-06-24 PROCEDURE — 76700 US EXAM ABDOM COMPLETE: CPT

## 2025-06-24 PROCEDURE — 76700 US EXAM ABDOM COMPLETE: CPT | Mod: 26
